# Patient Record
Sex: FEMALE | Race: OTHER | URBAN - METROPOLITAN AREA
[De-identification: names, ages, dates, MRNs, and addresses within clinical notes are randomized per-mention and may not be internally consistent; named-entity substitution may affect disease eponyms.]

---

## 2018-01-23 ENCOUNTER — APPOINTMENT (OUTPATIENT)
Dept: URBAN - METROPOLITAN AREA CLINIC 282 | Age: 28
Setting detail: DERMATOLOGY
End: 2018-01-24

## 2018-01-23 DIAGNOSIS — L60.8 OTHER NAIL DISORDERS: ICD-10-CM

## 2018-01-23 PROCEDURE — OTHER OTHER: OTHER

## 2018-01-23 PROCEDURE — 99202 OFFICE O/P NEW SF 15 MIN: CPT

## 2018-01-23 PROCEDURE — OTHER MIPS QUALITY: OTHER

## 2018-01-23 PROCEDURE — OTHER COUNSELING: OTHER

## 2018-01-23 ASSESSMENT — LOCATION DETAILED DESCRIPTION DERM: LOCATION DETAILED: RIGHT 2ND TOENAIL

## 2018-01-23 ASSESSMENT — LOCATION SIMPLE DESCRIPTION DERM: LOCATION SIMPLE: RIGHT 2ND TOE

## 2018-01-23 ASSESSMENT — LOCATION ZONE DERM: LOCATION ZONE: TOENAIL

## 2018-01-23 NOTE — PROCEDURE: MIPS QUALITY
Quality 111:Pneumonia Vaccination Status For Older Adults: Pneumococcal Vaccination not Administered or Previously Received, Reason not Otherwise Specified
Quality 226: Preventive Care And Screening: Tobacco Use: Screening And Cessation Intervention: Tobacco Screening OR Tobacco Cessation Intervention not Performed Reason Not Otherwise Specified
Quality 110: Preventive Care And Screening: Influenza Immunization: Influenza immunization was not ordered or administered, reason not given
Detail Level: Detailed
Quality 431: Preventive Care And Screening: Unhealthy Alcohol Use - Screening: Patient screened for unhealthy alcohol use using a single question and scores less than 2 times per year

## 2018-01-23 NOTE — PROCEDURE: OTHER
Note Text (......Xxx Chief Complaint.): This diagnosis correlates with the
Other (Free Text): Discussed with patient that to be absolutely sure that the pigment is benign would recommend nail biopsy.  \\nReferred to Dr. Maurer for possible nail biopsy.
Detail Level: Detailed

## 2018-01-23 NOTE — HPI: NAIL DISCOLORATION (MELANONYCHIA)
Is This A New Presentation, Or A Follow-Up?: Nail Discoloration
How Severe Is It?: mild
Additional History: Last pedicure Dec 2017